# Patient Record
Sex: FEMALE | Race: OTHER
[De-identification: names, ages, dates, MRNs, and addresses within clinical notes are randomized per-mention and may not be internally consistent; named-entity substitution may affect disease eponyms.]

---

## 2017-07-04 ENCOUNTER — HOSPITAL ENCOUNTER (EMERGENCY)
Dept: HOSPITAL 62 - ER | Age: 22
Discharge: HOME | End: 2017-07-04
Payer: COMMERCIAL

## 2017-07-04 VITALS — DIASTOLIC BLOOD PRESSURE: 70 MMHG | SYSTOLIC BLOOD PRESSURE: 119 MMHG

## 2017-07-04 DIAGNOSIS — Z87.440: ICD-10-CM

## 2017-07-04 DIAGNOSIS — N76.0: Primary | ICD-10-CM

## 2017-07-04 DIAGNOSIS — B96.89: ICD-10-CM

## 2017-07-04 DIAGNOSIS — Z88.0: ICD-10-CM

## 2017-07-04 LAB
APPEARANCE UR: CLEAR
BILIRUB UR QL STRIP: NEGATIVE
CHLAM PCR: NOT DETECTED
GLUCOSE UR STRIP-MCNC: NEGATIVE MG/DL
KETONES UR STRIP-MCNC: NEGATIVE MG/DL
NITRITE UR QL STRIP: NEGATIVE
PH UR STRIP: 5 [PH] (ref 5–9)
PROT UR STRIP-MCNC: NEGATIVE MG/DL
SP GR UR STRIP: 1.02
UROBILINOGEN UR-MCNC: NEGATIVE MG/DL (ref ?–2)

## 2017-07-04 PROCEDURE — 81025 URINE PREGNANCY TEST: CPT

## 2017-07-04 PROCEDURE — 87491 CHLMYD TRACH DNA AMP PROBE: CPT

## 2017-07-04 PROCEDURE — 99283 EMERGENCY DEPT VISIT LOW MDM: CPT

## 2017-07-04 PROCEDURE — 87591 N.GONORRHOEAE DNA AMP PROB: CPT

## 2017-07-04 PROCEDURE — 87210 SMEAR WET MOUNT SALINE/INK: CPT

## 2017-07-04 PROCEDURE — 81001 URINALYSIS AUTO W/SCOPE: CPT

## 2017-07-04 NOTE — ER DOCUMENT REPORT
ED General





- General


Chief Complaint: Vaginal Discharge


Stated Complaint: POSSIBLE UTI


Time Seen by Provider: 07/04/17 20:32


Mode of Arrival: Ambulatory


Information source: Patient


Notes: 


Patient presents to the emergency department with vaginal discharge odor.  

Patient reports that every time she has sex she gets a UTI.  She reports she 

was treated for a UTI last week with Cipro.  She reports now she is having  

white vaginal discharge that has an odor.  She denies other symptoms such as 

pain with void urinary frequency.  She denies fever vomiting diarrhea.


TRAVEL OUTSIDE OF THE [place] IN LAST 30 DAYS: No





- HPI


Onset: This morning


Quality of pain: No pain


Pain Level: Denies


Associated symptoms: None


Exacerbated by: Denies


Relieved by: Denies


Similar symptoms previously: Yes


Recently seen / treated by doctor: Yes





- Related Data


Allergies/Adverse Reactions: 


 





amoxicillin Allergy (Verified 07/04/17 20:09)


 


Penicillins Allergy (Verified 07/04/17 20:09)


 











Past Medical History





- General


Information source: Patient


Last Menstrual Period: 6/28/17





- Social History


Smoking Status: Unknown if Ever Smoked


Cigarette use (# per day): No


Frequency of alcohol use: Rare


Drug Abuse: None


Family History: Reviewed & Not Pertinent


Patient has suicidal ideation: No


Patient has homicidal ideation: No





- Medical History


Medical History: Negative


Renal/ Medical History: Denies: Hx Peritoneal Dialysis


Surgical Hx: Negative





Review of Systems





- Review of Systems


Notes: 


Review HPI for review of systems., All other systems negative





Physical Exam





- Vital signs


Vitals: 


 











Temp Pulse Resp BP Pulse Ox


 


 98.5 F   78   14   119/70   99 


 


 07/04/17 20:01  07/04/17 20:01  07/04/17 20:01  07/04/17 20:01  07/04/17 20:01














- Notes


Notes: 


PHYSICAL EXAMINATION: 


GENERAL: Well-appearing and in no acute distress 


HEAD: Atraumatic, normocephalic. 


EYES: Pupils equal round extraocular movements intact, sclera anicteric, 

conjunctiva are normal. 


ENT: nares patent,  . Moist mucous membranes. 


NECK: Normal range of motion, supple without lymphadenopathy 


LUNGS: CTAB and equal. No wheezes rales or rhonchi. 


HEART: Regular rate and rhythm without murmurs 


ABDOMEN: Soft, no tenderness. No guarding, no rebound 


BACK: Denies pain


EXTREMITIES: Normal range of motion, no pitting edema. No cyanosis. 


NEUROLOGICAL: Cranial nerves grossly intact. Normal sensory/motor exams. 


PSYCH: Normal mood, normal affect. 


SKIN: Warm, Dry, normal turgor, no rashes or lesions noted








- Genitourinary


External exam: Normal


Speculum exam: Normal


Vaginal bleeding: None


Bimanuel exam: Normal





Course





- Re-evaluation


Re-evalutation: 


07/04/17 20:41


Patient instructed on pelvic exam.  Patient also instructed on pending UA and 

STD/wet mount cultures.  She verbalized understanding to all instructions.





07/04/17 21:38


Wet mount with epi cells and bacteria. No yeast.  UA negative.  STD cultures 

pending but patient does not feel like this is an STD.  Patient instructed on 

wet mount and Flagyl.  Patient instructed on signs and symptoms of allergic 

reaction.  Patient instructed to follow-up with OB/GYN or the health department 

for recheck.  She verbalized understanding to all instructions





 





 








- Vital Signs


Vital signs: 


 











Temp Pulse Resp BP Pulse Ox


 


 98.5 F   78   14   119/70   99 


 


 07/04/17 20:01  07/04/17 20:01  07/04/17 20:01  07/04/17 20:01  07/04/17 20:01














- Laboratory


Laboratory results interpreted by me: 


 











  07/04/17





  20:40


 


Urine Ascorbic Acid  40 H














Procedures





- Pelvic Exam


  ** Pelvic exam


Cultures obtained: Yes


Wet prep obtained: Yes


Herpes culture obtained: No


POC sent to lab: No


Foreign body removed: No


Bimanual exam performed: Yes


Witnessed by: danilo LOYA





Discharge





- Discharge


Clinical Impression: 


 Vaginal discharge, Bacterial vaginosis





Condition: Stable


Disposition: HOME, SELF-CARE


Instructions:  [facility] Department, Vaginosis, Bacterial (Critical access hospital), 

Metronidazole (Critical access hospital), Ob-Gyn Doctors


Additional Instructions: 


*You have been evaluated for vaginal discharge,.  Bacterial vaginosis


*STD cultures are pending. You will be contacted should you need treatment


*Take medication as prescribed


*Follow up with your OB/GYN or the health department for recheck within one week


*Avoid sexual intercourse until follow up


*Return to ED for worsening condition, changes, needs


Prescriptions: 


Metronidazole [Flagyl 500 mg Tablet] 500 mg PO BID #14 tablet

## 2017-09-16 ENCOUNTER — HOSPITAL ENCOUNTER (EMERGENCY)
Dept: HOSPITAL 62 - ER | Age: 22
Discharge: HOME | End: 2017-09-16
Payer: COMMERCIAL

## 2017-09-16 VITALS — SYSTOLIC BLOOD PRESSURE: 136 MMHG | DIASTOLIC BLOOD PRESSURE: 75 MMHG

## 2017-09-16 DIAGNOSIS — N89.8: Primary | ICD-10-CM

## 2017-09-16 DIAGNOSIS — R30.0: ICD-10-CM

## 2017-09-16 LAB
APPEARANCE UR: CLEAR
BILIRUB UR QL STRIP: NEGATIVE
CHLAM PCR: NOT DETECTED
GLUCOSE UR STRIP-MCNC: NEGATIVE MG/DL
KETONES UR STRIP-MCNC: NEGATIVE MG/DL
NITRITE UR QL STRIP: NEGATIVE
PH UR STRIP: 5 [PH] (ref 5–9)
PROT UR STRIP-MCNC: NEGATIVE MG/DL
SP GR UR STRIP: 1.01
UROBILINOGEN UR-MCNC: NEGATIVE MG/DL (ref ?–2)

## 2017-09-16 PROCEDURE — 81001 URINALYSIS AUTO W/SCOPE: CPT

## 2017-09-16 PROCEDURE — 99283 EMERGENCY DEPT VISIT LOW MDM: CPT

## 2017-09-16 PROCEDURE — 87210 SMEAR WET MOUNT SALINE/INK: CPT

## 2017-09-16 PROCEDURE — 87591 N.GONORRHOEAE DNA AMP PROB: CPT

## 2017-09-16 PROCEDURE — 81025 URINE PREGNANCY TEST: CPT

## 2017-09-16 PROCEDURE — 87086 URINE CULTURE/COLONY COUNT: CPT

## 2017-09-16 PROCEDURE — 87491 CHLMYD TRACH DNA AMP PROBE: CPT

## 2017-09-16 NOTE — ER DOCUMENT REPORT
HPI





- HPI


Pain Level: 3


Notes: 





Patient is a 21-year-old female who presents the ED complaining of vaginal 

discharge and odor and occasional burning with urination 2 days.  Patient 

states that she was seen in July and was diagnosed with bacterial vaginosis.  

Patient states that she has recurrent bacterial vaginosis without any STDs.  

Patient states that she is  and is only monogamous with her .  

She is still eating and drinking without any difficulties.  Denies any headache

, fever, URI, sore throat, chest pain, palpitations, syncope, cough, shortness 

of breath, wheeze, dyspnea, abdominal pain, pelvic pain, vaginal pain, nausea/

vomiting/diarrhea, urinary retention, hematuria, or rash.





Pt states allergy to Amoxicillin (rash only), but has had PCN in the past per 

patient.





- ROS


Notes: 





REVIEW OF SYSTEMS:


CONSTITUTIONAL :  Denies fever,  chills, or sweats.  Denies recent illness.


EENT:   Denies eye, ear, throat, or mouth pain or symptoms.  Denies nasal or 

sinus congestion or discharge.  Denies throat, tongue, or mouth swelling or 

difficulty swallowing.


CARDIOVASCULAR:  Denies chest pain.  Denies palpitations or racing or irregular 

heart beat.  Denies ankle edema.


RESPIRATORY:  Denies cough, cold, or chest congestion.  Denies shortness of 

breath, difficulty breathing, or wheezing.


GASTROINTESTINAL:  Denies abdominal pain or distention.  Denies nausea, vomiting

, or diarrhea.  Denies blood in vomitus, stools, or per rectum.  Denies black, 

tarry stools.  Denies constipation. 


GENITOURINARY:  Denies difficulty urinating, painful urination, burning, 

frequency, blood in urine, or discharge.


FEMALE  GENITOURINARY:  see hpi


MUSCULOSKELETAL:  Denies back or neck pain or stiffness.  Denies joint pain or 

swelling.


SKIN:   Denies rash, lesions or sores.


NEUROLOGICAL:  Denies confusion or altered mental status.  Denies passing out 

or loss of consciousness.  Denies dizziness or lightheadedness.  Denies 

headache.  Denies weakness or paralysis or loss of use of either side.  Denies 

problems with gait or speech.  Denies sensory loss, numbness, or tingling.  





ALL OTHER SYSTEMS REVIEWED AND NEGATIVE.








Dictation was performed using Dragon voice recognition software





- DERM


Skin Color: Normal





Past Medical History





- Social History


Smoking Status: Never Smoker


Family History: Reviewed & Not Pertinent


Patient has suicidal ideation: No


Patient has homicidal ideation: No


Renal/ Medical History: Denies: Hx Peritoneal Dialysis





- Immunizations


Hx Diphtheria, Pertussis, Tetanus Vaccination: Yes





Vertical Provider Document





- CONSTITUTIONAL


Agree With Documented VS: Yes


Notes: 





PHYSICAL EXAMINATION:





GENERAL: Well-appearing, well-nourished and in no acute distress.





LUNGS: Breath sounds clear to auscultation bilaterally and equal.  No wheezes 

rales or rhonchi.





HEART: Regular rate and rhythm without murmurs





ABDOMEN: Soft, nontender, nondistended abdomen.  No guarding, no rebound.  No 

masses appreciated.  Normal bowel sounds present.  CVA tenderness negative 

bilaterally.





Female : No inguinal adenopathy.  External genitalia without erythema, lesions

, or masses.  Vaginal mucosa pink with scant white/clear discharge.  Cervix 

parous, pink, and without discharge.  Uterus is smooth.  No adnexal tenderness.





Extremities:  No cyanosis/clubbing/edema b/l.  Peripheral pulses 2+.  Capillary 

refill less than 3 seconds.





NEUROLOGICAL: Normal speech, normal gait.  





PSYCH: Normal mood, normal affect.





SKIN: Warm, Dry, normal turgor, no rashes or lesions noted.





- INFECTION CONTROL


TRAVEL OUTSIDE OF THE [place] IN LAST 30 DAYS: No





- RESPIRATORY


O2 Sat by Pulse Oximetry: 100





Course





- Re-evaluation


Re-evalutation: 





09/16/17 15:15


Patient is an afebrile, well-hydrated, 21-year-old female who presents the ED 

with dysuria and vaginal discharge, suspect normal vaginal material.  Vitals 

are stable.  PE otherwise unremarkable.  Urine pregnancy was negative.  

Urinalysis was unremarkable for any acute pathology.  UC pending.  Wet mount 

did not show any trichomonas, bacterial vaginosis, or yeast.  Chlamydia/

gonorrhea pending.  Zithromax 1 g given p.o. today, but patient declined to 

Rocephin injection with risks and benefits understood.  Patient states that she 

will return if needed.  I overall have a low suspicion for any STD at this 

time.  No other imaging warranted at this time.  Low suspicion/risk for acute 

appendicitis, bowel obstruction, acute cholecystitis, acute cholangitis, 

perforated diverticulitis, incarcerated hernia, pancreatitis, perforated ulcer, 

peritonitis, sepsis, pelvic inflammatory disease, ectopic pregnancy, tubo-

ovarian abscess, ovarian torsion, or other systemic emergent condition at this 

time.  Patient is aware that her condition can change from initial presentation 

and she needs to monitor symptoms closely and seek medical attention if any 

acute changes.  Conservative measures otherwise for symptoms.  Recheck with 

OBGYN next week.  Recheck with your PCM next week.  Return to the ED with any 

worsening/concerning symptoms otherwise as reviewed in discharge.  Patient is 

in agreement.








- Vital Signs


Vital signs: 


 











Temp Pulse Resp BP Pulse Ox


 


 97.8 F   69   16   136/75 H  100 


 


 09/16/17 12:57  09/16/17 12:57  09/16/17 12:57  09/16/17 12:57  09/16/17 12:57














Procedures





- Pelvic Exam


  ** Pelvic exam


Time completed: 14:15


Cultures obtained: Yes


Wet prep obtained: Yes


Herpes culture obtained: No


Foreign body removed: No


Bimanual exam performed: Yes - neg


Witnessed by: female nurse





Discharge





- Discharge


Clinical Impression: 


 Vaginal discharge, Dysuria





Condition: Stable


Disposition: HOME, SELF-CARE


Additional Instructions: 


Push fluids (i.e. water, cranberry juice)


Proper hygenic technique


Keep the skin clean


Safe sexual practices 


Tylenol/ibuprofen as needed


May use over the counter AZO for any burning with urination


Check in with the health department this week for further testing if needed


Your chlamydia/Ghon test are pending and you will be notified if positive 

results; you may call in 1 day for the results as well


Recheck with the women[facility] clinic/establish for recheck in 1 week.


Return immediately if symptoms worsen


Consider consult with a Urologist for ongoing/worsening symptoms.


Return to the ED with any development of HA/fever, trouble with vision, eye 

redness, worsening pain, urethral discharge, urinary retention, blood in the 

urine, flank pain, abdominal pain, n/v, Chest Pain, shortness of breath, joint 

pains, trouble breathing, or any other worsening/concerning symptoms as needed 

otherwise.


Forms:  Elevated Blood Pressure


Referrals: 


[facility] [Provider Group] - Follow up in 1 week

## 2019-02-27 ENCOUNTER — HOSPITAL ENCOUNTER (EMERGENCY)
Dept: HOSPITAL 62 - ER | Age: 24
LOS: 1 days | Discharge: HOME | End: 2019-02-28
Payer: COMMERCIAL

## 2019-02-27 VITALS — DIASTOLIC BLOOD PRESSURE: 68 MMHG | SYSTOLIC BLOOD PRESSURE: 128 MMHG

## 2019-02-27 DIAGNOSIS — Z20.2: Primary | ICD-10-CM

## 2019-02-27 DIAGNOSIS — N76.0: ICD-10-CM

## 2019-02-27 DIAGNOSIS — B96.89: ICD-10-CM

## 2019-02-27 PROCEDURE — 87210 SMEAR WET MOUNT SALINE/INK: CPT

## 2019-02-27 PROCEDURE — 87591 N.GONORRHOEAE DNA AMP PROB: CPT

## 2019-02-27 PROCEDURE — 99283 EMERGENCY DEPT VISIT LOW MDM: CPT

## 2019-02-27 PROCEDURE — 87491 CHLMYD TRACH DNA AMP PROBE: CPT

## 2019-02-28 LAB
BACTERIA (WET MOUNT): (no result)
CHLAM PCR: NOT DETECTED
EPITHELIALS (WET MOUNT): (no result)
GON PCR: NOT DETECTED
T.VAGINALIS (WET MOUNT): (no result)
WBCS (WET MOUNT): (no result)
YEAST (WET MOUNT): (no result)

## 2019-02-28 NOTE — ER DOCUMENT REPORT
HPI





- HPI


Patient complains to provider of: STD testing


Time Seen by Provider: 02/28/19 00:24


Quality of pain: No pain


Pain Level: Denies


Context: 





Patient states that she had a Pap test performed 2 weeks ago and was notified 

today that she had a positive chlamydia test and needed to be treated.  Patient 

states that her spouse had STD testing in his was negative.  Patient states she 

wants to have a repeat test performed.  Patient denies any pelvic pain, vaginal 

bleeding or discharge, urinary symptoms.


Associated Symptoms: None


Exacerbated by: Denies


Relieved by: Denies


Similar symptoms previously: No


Recently seen / treated by doctor: Yes





- ROS


ROS below otherwise negative: Yes


Systems Reviewed and Negative: Yes All other systems reviewed and negative





- CONSTITUTIONAL


Constitutional: DENIES: Fever, Chills





- GASTROINTESTINAL


Gastrointestinal: DENIES: Abdominal Pain





- REPRODUCTIVE


Reproductive: DENIES: Pregnant:, Abnormal bleeding / discharge





- DERM


Skin Color: Normal


Skin Problems: None





Past Medical History





- General


Information source: Patient





- Social History


Smoking Status: Never Smoker


Frequency of alcohol use: None


Drug Abuse: None


Occupation: None


Lives with: Spouse/Significant other


Family History: Reviewed & Not Pertinent


Patient has suicidal ideation: No


Patient has homicidal ideation: No





- Medical History


Medical History: Negative


Renal/ Medical History: Denies: Hx Peritoneal Dialysis


Surgical Hx: Negative





- Immunizations


Hx Diphtheria, Pertussis, Tetanus Vaccination: Yes





Vertical Provider Document





- CONSTITUTIONAL


Agree With Documented VS: Yes


Exam Limitations: No Limitations


General Appearance: WD/WN, No Apparent Distress





- INFECTION CONTROL


TRAVEL OUTSIDE OF THE [place] IN LAST 30 DAYS: No





- HEENT


HEENT: Atraumatic, Normocephalic





- NECK


Neck: Normal Inspection, Supple





- RESPIRATORY


Respiratory: Breath Sounds Normal, No Respiratory Distress





- CARDIOVASCULAR


Cardiovascular: Regular Rate, Regular Rhythm





- GI/ABDOMEN


Gastrointestinal: Abdomen Soft, Abdomen Non-Tender





- REPRODUCTIVE


Female Genitalia: negative: CMT, Adnexal Pain-Right, Adnexal Pain-Left


Notes: 





white vaginal discharge





- MUSCULOSKELETAL/EXTREMETIES


Musculoskeletal/Extremeties: MAEW





- NEURO


Level of Consciousness: Awake, Alert, Appropriate


Motor/Sensory: No Motor Deficit





- DERM


Integumentary: Warm, Dry, No Rash





Course





- Vital Signs


Vital signs: 


                                        











Temp Pulse Resp BP Pulse Ox


 


 98.8 F   88   15   128/68 H  99 


 


 02/27/19 22:58  02/27/19 22:58  02/27/19 22:58  02/27/19 22:58  02/27/19 22:58














- Laboratory


Laboratory results interpreted by me: 





02/28/19 01:20


                               Labs- Entire Visit











  02/28/19





  01:05


 


Epi Cells (Wet Prep)  3+ EPITHELIALS SEEN


 


Bacteria (Wet Prep)  3+ BACTERIA SEEN


 


Trichomonas (Wet Prep)  NO TRICHOMONAS SEEN


 


Vaginal WBC  1+ WBCS SEEN


 


Vaginal Yeast  NO YEAST SEEN














Discharge





- Discharge


Clinical Impression: 


 Vaginal discharge, Bacterial vaginosis, Concern about STD in female without 

diagnosis





Condition: Stable


Disposition: HOME, SELF-CARE


Instructions:  Azithromycin ([facility]), Metronidazole ([facility]), Vaginosis, Bacterial 

([facility])


Additional Instructions: 


Return immediately for any new or worsening symptoms





Followup with your primary care provider, call tomorrow to make a followup 

appointment








Prescriptions: 


Metronidazole [Flagyl 500 mg Tablet] 500 mg PO BID #14 tablet


Referrals: 


[facility] [Provider Group] - Follow up as needed